# Patient Record
Sex: MALE | Race: WHITE
[De-identification: names, ages, dates, MRNs, and addresses within clinical notes are randomized per-mention and may not be internally consistent; named-entity substitution may affect disease eponyms.]

---

## 2022-09-15 ENCOUNTER — HOSPITAL ENCOUNTER (EMERGENCY)
Dept: HOSPITAL 47 - EC | Age: 62
Discharge: HOME | End: 2022-09-15
Payer: COMMERCIAL

## 2022-09-15 VITALS
SYSTOLIC BLOOD PRESSURE: 145 MMHG | DIASTOLIC BLOOD PRESSURE: 94 MMHG | HEART RATE: 61 BPM | RESPIRATION RATE: 24 BRPM | TEMPERATURE: 98.1 F

## 2022-09-15 DIAGNOSIS — Z79.899: ICD-10-CM

## 2022-09-15 DIAGNOSIS — E78.5: ICD-10-CM

## 2022-09-15 DIAGNOSIS — K21.9: Primary | ICD-10-CM

## 2022-09-15 DIAGNOSIS — I10: ICD-10-CM

## 2022-09-15 DIAGNOSIS — F17.200: ICD-10-CM

## 2022-09-15 LAB
ALBUMIN SERPL-MCNC: 4.8 G/DL (ref 3.5–5)
ALP SERPL-CCNC: 135 U/L (ref 38–126)
ALT SERPL-CCNC: 32 U/L (ref 4–49)
ANION GAP SERPL CALC-SCNC: 16 MMOL/L
AST SERPL-CCNC: 37 U/L (ref 17–59)
BASOPHILS # BLD AUTO: 0 K/UL (ref 0–0.2)
BASOPHILS NFR BLD AUTO: 1 %
BUN SERPL-SCNC: 13 MG/DL (ref 9–20)
CALCIUM SPEC-MCNC: 10 MG/DL (ref 8.4–10.2)
CHLORIDE SERPL-SCNC: 102 MMOL/L (ref 98–107)
CO2 SERPL-SCNC: 23 MMOL/L (ref 22–30)
EOSINOPHIL # BLD AUTO: 0.2 K/UL (ref 0–0.7)
EOSINOPHIL NFR BLD AUTO: 2 %
ERYTHROCYTE [DISTWIDTH] IN BLOOD BY AUTOMATED COUNT: 4.79 M/UL (ref 4.3–5.9)
ERYTHROCYTE [DISTWIDTH] IN BLOOD: 13 % (ref 11.5–15.5)
GLUCOSE SERPL-MCNC: 89 MG/DL (ref 74–99)
HCT VFR BLD AUTO: 48.6 % (ref 39–53)
HGB BLD-MCNC: 16.3 GM/DL (ref 13–17.5)
INR PPP: 1 (ref ?–1.2)
LYMPHOCYTES # SPEC AUTO: 2.1 K/UL (ref 1–4.8)
LYMPHOCYTES NFR SPEC AUTO: 26 %
MCH RBC QN AUTO: 34.1 PG (ref 25–35)
MCHC RBC AUTO-ENTMCNC: 33.6 G/DL (ref 31–37)
MCV RBC AUTO: 101.5 FL (ref 80–100)
MONOCYTES # BLD AUTO: 0.6 K/UL (ref 0–1)
MONOCYTES NFR BLD AUTO: 7 %
NEUTROPHILS # BLD AUTO: 5 K/UL (ref 1.3–7.7)
NEUTROPHILS NFR BLD AUTO: 62 %
PLATELET # BLD AUTO: 334 K/UL (ref 150–450)
POTASSIUM SERPL-SCNC: 4.2 MMOL/L (ref 3.5–5.1)
PROT SERPL-MCNC: 7.4 G/DL (ref 6.3–8.2)
PT BLD: 10.5 SEC (ref 9–12)
SODIUM SERPL-SCNC: 141 MMOL/L (ref 137–145)
WBC # BLD AUTO: 8.1 K/UL (ref 3.8–10.6)

## 2022-09-15 PROCEDURE — 80053 COMPREHEN METABOLIC PANEL: CPT

## 2022-09-15 PROCEDURE — 99285 EMERGENCY DEPT VISIT HI MDM: CPT

## 2022-09-15 PROCEDURE — 93005 ELECTROCARDIOGRAM TRACING: CPT

## 2022-09-15 PROCEDURE — 85025 COMPLETE CBC W/AUTO DIFF WBC: CPT

## 2022-09-15 PROCEDURE — 85610 PROTHROMBIN TIME: CPT

## 2022-09-15 PROCEDURE — 71046 X-RAY EXAM CHEST 2 VIEWS: CPT

## 2022-09-15 PROCEDURE — 36415 COLL VENOUS BLD VENIPUNCTURE: CPT

## 2022-09-15 PROCEDURE — 84484 ASSAY OF TROPONIN QUANT: CPT

## 2022-09-15 NOTE — ED
Chest Pain HPI





- General


Chief Complaint: Chest Pain


Stated Complaint: MARK,Chest pain


Time Seen by Provider: 09/15/22 19:58


Source: patient, RN notes reviewed


Mode of arrival: ambulatory


Limitations: no limitations





- History of Present Illness


Initial Comments: 





This patient presented with daily chest pain for the last week.  Patient states 

he seems to be getting chest discomfort after he wakes up whether he is 

partaking in any activity or not.  Patient states it feels like a pain which she

is having a hard time characterizing in the center of his chest.  He states it 

lasts about 15 minutes, sometimes up to 45 minutes time he also has a sensation 

that he is having a hard time getting a breath in.  The symptoms resolved and he

is completely asymptomatic.  Again, he states this seems to be having in the 

morning.  Patient does have a history of gastroesophageal reflux disease.  

Patient is a cigarette smoker and does have a family history of heart disease in

his father at age 55.  Patient states his brother and sisters have no cardiac 

disease.  Patient currently asymptomatic.  Denying any current chest pain or 

shortness of breath.  No fever or chills.





No headache, no fever or chills, no changes in vision or hearing, no sore throat

or difficulty with speech, no neck pain,, no abdominal pain, no nausea or 

vomiting, no changes in urination or bowel movements, no numbness or tingling, 

no extremity pain, no skin rashes or lesions.





Past medical, surgical, social, and family history reviewed.





- Related Data


                                Home Medications











 Medication  Instructions  Recorded  Confirmed


 


Butalb/APAP/Caff -40Mg 1 tab PO Q12H PRN 17





[Fioricet -40]   


 


Cyclobenzaprine [Flexeril] 10 mg PO HS 17


 


HYDROcodone/APAP 7.5-325MG [Norco 1 tab PO BID PRN 17





7.5-325]   


 


Ibuprofen [Motrin] 800 mg PO Q6H PRN 17


 


Omeprazole 20 mg PO W/SUPPER 17


 


amLODIPine [Norvasc] 5 mg PO DAILY 17








                                  Previous Rx's











 Medication  Instructions  Recorded


 


Aspirin 81 mg PO DAILY  chew 17


 


Atorvastatin [Lipitor] 10 mg PO DAILY #30 tab 17











                                    Allergies











Allergy/AdvReac Type Severity Reaction Status Date / Time


 


No Known Allergies Allergy   Verified 09/15/22 16:15














Review of Systems


ROS Statement: 


Those systems with pertinent positive or pertinent negative responses have been 

documented in the HPI.





ROS Other: All systems not noted in ROS Statement are negative.





EKG Findings





- EKG Comments:


EKG Findings:: EKG done at 1635 and read by the ED attending physician reveals 

sinus rhythm with rate of 62.  Normal intervals.  Normal axis.  Normal QRS 

morphology.  No acute ST or T-wave changes.





Past Medical History


Past Medical History: GERD/Reflux, Hyperlipidemia, Hypertension


Additional Past Medical History / Comment(s): Migraines, Raynauld's bilateral 

hands, chronic low back pain.


History of Any Multi-Drug Resistant Organisms: None Reported


Past Surgical History: Back Surgery


Additional Past Surgical History / Comment(s): Low back surgery, colonoscopy.


Past Anesthesia/Blood Transfusion Reactions: No Reported Reaction


Past Psychological History: No Psychological Hx Reported


Past Alcohol Use History: Daily


Past Drug Use History: None Reported





- Past Family History


  ** Mother


Family Medical History: No Reported History


Additional Family Medical History / Comment(s): Mother is 89yrs old and very 

healthy





  ** Father


Family Medical History: Congestive Heart Failure (CHF)


Additional Family Medical History / Comment(s): Father had CABG.  He  of chf

at the age of 55 yrs.  Father's brother  of a MI at the age of 34yrs.





General Exam





- General Exam Comments


Initial Comments: 





This is a well-developed, well-nourished, male in no acute distress.  Patient is

asymptomatic.  His vital signs reviewed.  No distress


Limitations: no limitations


General appearance: alert, in no apparent distress


Head exam: Present: atraumatic, normocephalic, normal inspection


Eye exam: Present: normal appearance, PERRL, EOMI.  Absent: scleral icterus, 

conjunctival injection, periorbital swelling


ENT exam: Present: normal exam, mucous membranes moist.  Absent: normal 

oropharynx, mucous membranes dry, TM's normal bilaterally, normal external ear 

exam


Neck exam: Present: normal inspection, full ROM.  Absent: tenderness, 

meningismus, lymphadenopathy


Respiratory exam: Present: normal lung sounds bilaterally.  Absent: respiratory 

distress, wheezes, rales, rhonchi, stridor


Cardiovascular Exam: Present: regular rate, normal rhythm, normal heart sounds. 

Absent: systolic murmur, diastolic murmur, rubs, gallop, clicks


GI/Abdominal exam: Present: soft, normal bowel sounds.  Absent: distended, 

tenderness, guarding, rebound, rigid


Extremities exam: Present: normal inspection, full ROM, normal capillary refill.

 Absent: tenderness, pedal edema, joint swelling, calf tenderness


Back exam: Present: normal inspection


Neurological exam: Present: alert, oriented X3, CN II-XII intact


Psychiatric exam: Present: normal affect, normal mood.  Absent: anxious, flat 

affect


Skin exam: Present: warm, dry, intact, normal color.  Absent: rash, cyanosis, 

diaphoretic, erythema, urticaria, vesicles





Course


                                   Vital Signs











  09/15/22





  16:11


 


Temperature 98.1 F


 


Pulse Rate 61


 


Respiratory 24





Rate 


 


Blood Pressure 145/94


 


O2 Sat by Pulse 100





Oximetry 














Chest Pain MDM





- MDM





Given the patient's age, cardiac risk or, smoking status, and family history.  I

did advise that the patient stayed in the hospital for chest pain rule out.  

Patient states he would like to leave.  Patient states he is asymptomatic and 

ready to go home.  Again, adamantly advised that the patient stay.  Patient 

refusing admission.  Patient is pleasant, lucid, alert and oriented 4, able to 

make his own medical decisions.  We did discuss risks versus benefits.  Patient 

states he is willing to take the risk and he knows Yudi chance this is cardiac

related.





Does not appear to be consistent with pulmonary embolism or infectious etiology.

 Again, patient currently asymptomatic





The case was discussed in detail with ED attending physician.  Presentation, 

findings, treatment plan discussed in detail.





Patient had a nondiagnostic EKG and a negative troponin here.  Certainly this 

could be related to acid reflux.  I'm going to have the patient elevate the head

of his bed and follow-up with his regular physician.  We'll have him continue 

his proton pump inhibitor.  I did tell him to add in a daily aspirin.  We also 

talked about smoking cessation in excess of 3 minutes.  Patient states he knows 

that he needs to quit.





Supervising physicians Dr. Vernon





Disposition


Clinical Impression: 


 Chest pain, Acid reflux





Disposition: HOME SELF-CARE


Condition: Stable


Instructions (If sedation given, give patient instructions):  Chest Pain (ED), 

GERD (Gastroesophageal Reflux Disease) (ED)


Additional Instructions: 


Follow-up with your regular physician as directed.  Return to the ER immediately

if any symptoms worsen, new symptoms arise, or any other problems develop.  

Continue Prilosec, take a daily aspirin, 325 mg.


Is patient prescribed a controlled substance at d/c from ED?: No


Referrals: 


Suleiman Harris [Primary Care Provider] - As Soon As Possible


Time of Disposition: 20:13

## 2022-09-15 NOTE — XR
EXAMINATION TYPE: XR chest 2V

 

DATE OF EXAM: 9/15/2022

 

COMPARISON: 11/28/2017

 

HISTORY: Shortness of breath

 

TECHNIQUE:  Frontal and lateral views of the chest are obtained.

 

FINDINGS:  There is no focal air space opacity, pleural effusion, or pneumothorax seen.  The cardiac 
silhouette size is within normal limits.   The osseous structures are intact.

 

IMPRESSION:  No acute cardiopulmonary process.

## 2024-11-20 ENCOUNTER — HOSPITAL ENCOUNTER (OUTPATIENT)
Dept: HOSPITAL 47 - RADCTMAIN | Age: 64
Discharge: HOME | End: 2024-11-20
Attending: STUDENT IN AN ORGANIZED HEALTH CARE EDUCATION/TRAINING PROGRAM
Payer: MEDICARE

## 2024-11-20 DIAGNOSIS — I70.0: ICD-10-CM

## 2024-11-20 DIAGNOSIS — R91.1: Primary | ICD-10-CM

## 2024-11-20 DIAGNOSIS — N20.0: ICD-10-CM

## 2024-11-20 PROCEDURE — 71250 CT THORAX DX C-: CPT
